# Patient Record
Sex: MALE | Race: ASIAN
[De-identification: names, ages, dates, MRNs, and addresses within clinical notes are randomized per-mention and may not be internally consistent; named-entity substitution may affect disease eponyms.]

---

## 2018-02-18 ENCOUNTER — HOSPITAL ENCOUNTER (EMERGENCY)
Dept: HOSPITAL 45 - C.EDB | Age: 23
Discharge: HOME | End: 2018-02-18
Payer: COMMERCIAL

## 2018-02-18 VITALS
BODY MASS INDEX: 23.82 KG/M2 | BODY MASS INDEX: 23.82 KG/M2 | HEIGHT: 67.99 IN | WEIGHT: 157.19 LBS | HEIGHT: 67.99 IN | WEIGHT: 157.19 LBS

## 2018-02-18 VITALS — HEART RATE: 82 BPM | SYSTOLIC BLOOD PRESSURE: 138 MMHG | DIASTOLIC BLOOD PRESSURE: 86 MMHG | OXYGEN SATURATION: 100 %

## 2018-02-18 VITALS — TEMPERATURE: 98.42 F

## 2018-02-18 DIAGNOSIS — R04.0: ICD-10-CM

## 2018-02-18 DIAGNOSIS — Z91.048: ICD-10-CM

## 2018-02-18 DIAGNOSIS — R07.89: Primary | ICD-10-CM

## 2018-02-18 LAB
BASOPHILS # BLD: 0.03 K/UL (ref 0–0.2)
BASOPHILS NFR BLD: 0.4 %
BUN SERPL-MCNC: 14 MG/DL (ref 7–18)
CA-I BLD-SCNC: 1.22 MMOL/L (ref 1.12–1.32)
CALCIUM SERPL-MCNC: 9.4 MG/DL (ref 8.5–10.1)
CO2 SERPL-SCNC: 27 MMOL/L (ref 21–32)
CREAT BLD-MCNC: 1.1 MG/DL (ref 0.6–1.3)
CREAT SERPL-MCNC: 1.07 MG/DL (ref 0.6–1.4)
EOS ABS #: 0.38 K/UL (ref 0–0.5)
EOSINOPHIL NFR BLD AUTO: 304 K/UL (ref 130–400)
GLUCOSE SERPL-MCNC: 80 MG/DL (ref 70–99)
HCT VFR BLD CALC: 46.4 % (ref 42–52)
HGB BLD-MCNC: 16.4 G/DL (ref 14–18)
IG#: 0.02 K/UL (ref 0–0.02)
IMM GRANULOCYTES NFR BLD AUTO: 28.1 %
ISTAT POTASSIUM: 4 MEQ/L (ref 3.3–5)
LYMPHOCYTES # BLD: 2.4 K/UL (ref 1.2–3.4)
MCH RBC QN AUTO: 31.3 PG (ref 25–34)
MCHC RBC AUTO-ENTMCNC: 35.3 G/DL (ref 32–36)
MCV RBC AUTO: 88.5 FL (ref 80–100)
MONO ABS #: 0.83 K/UL (ref 0.11–0.59)
MONOCYTES NFR BLD: 9.7 %
NEUT ABS #: 4.87 K/UL (ref 1.4–6.5)
NEUTROPHILS # BLD AUTO: 4.5 %
NEUTROPHILS NFR BLD AUTO: 57.1 %
PMV BLD AUTO: 9.8 FL (ref 7.4–10.4)
POTASSIUM SERPL-SCNC: 3.7 MMOL/L (ref 3.5–5.1)
RED CELL DISTRIBUTION WIDTH CV: 12.6 % (ref 11.5–14.5)
RED CELL DISTRIBUTION WIDTH SD: 40.2 FL (ref 36.4–46.3)
SODIUM BLD-SCNC: 145 MEQ/L (ref 135–144)
SODIUM SERPL-SCNC: 142 MMOL/L (ref 136–145)
WBC # BLD AUTO: 8.53 K/UL (ref 4.8–10.8)

## 2018-02-18 NOTE — EMERGENCY ROOM VISIT NOTE
History


Report prepared by Shantiibe:  Rhea Rodriguez


Under the Supervision of:  Dr. Gurmeet Chau M.D.


First contact with patient:  10:37


Chief Complaint:  CHEST PAIN


Stated Complaint:  CHEST PAIN/SHOULDER&NECK PAIN & NOSE BLEED


Nursing Triage Summary:  


Last Monday CP on the left side, pain with deep inspiration, stretching chest, 


and when lying down. Denies shortness of breath. Then developed right chest 

pain 


hurts in "jolts" and when it comes it radiates into right side of neck and 


shoulder. Pt also c/o nose bleeds for approximately a month. Pt also verbalizes 


he was at [facility] yesterday, was up for 37 hours and felt like he was going to 

pass 


out so went home. 











Pt traveled to [place] and [place] Dec 27th for 10 days. 











Pt went to [facility] for this two days ago, did EKG and DDMR which were both 

negative. 














Presents today because pain is not going away and nose bleeding seems worse 

this 


weekend.





History of Present Illness


The patient is a 22 year old male who presents to the Emergency Room with 

complaints of intermittent chest pain for the past 1 week. He states the pain 

occasionally radiates into the right side of his neck and his right shoulder. 

He rates his pain as a 3/10 in severity. Laying down worsens his pain. He 

denies any shortness of breath or rashes. He went to [facility] recently for his chest 

pain and produced a negative EKG and D-Dimer. The patient also complains of 

epistaxis for the past 1 month. He was at [facility] yesterday and was awake for 37 

hours and felt like he was going to pass out, so he left. He states he did not 

actually pass out. He denies any recent fevers, chills, cough or cold symptoms. 

He states he has no chronic medical problems and only takes Claritin as needed. 

He denies any recent drug use. The patient notes he did recently travel to 

[place] and [place] in late December.





   Source of History:  patient


   Onset:  1 month PTA


   Position:  chest


   Symptom Intensity:  3/10


   Timing:  intermittent


   Modifying Factors (Worsening):  rest (laying down)


   Associated Symptoms:  No LOC, No fevers, No chills, No cough (or cold 

symptoms), No SOB





Review of Systems


See HPI for pertinent positives & negatives. A total of 10 systems reviewed and 

were otherwise negative.





Past Medical & Surgical


Medical Problems:


(1) Seasonal allergies








Social History


Smoking Status:  Never Smoker


Alcohol Use:  occasionally


Drug Use:  none


Marital Status:  single


Housing Status:  lives with roommate


Occupation Status:  [employer] student





Current/Historical Medications


No Active Prescriptions or Reported Meds





Allergies


Coded Allergies:  


     No Known Allergies (Unverified , 2/18/18)





Physical Exam


Vital Signs











  Date Time  Temp Pulse Resp B/P (MAP) Pulse Ox O2 Delivery O2 Flow Rate FiO2


 


2/18/18 12:08  82 16 138/86 100   


 


2/18/18 10:28 36.9 92 18 135/88 98 Room Air  


 


2/18/18 10:28     97 Room Air  











Physical Exam


GENERAL: Patient is mildly anxious appearing and in minimal distress.


HEENT: No acute trauma, normocephalic atraumatic, mucous membranes moist, no 

nasal congestion, no scleral icterus.


NECK: No stridor, no adenopathy, no meningismus, trachea is midline.


LUNGS: No dyspnea. Clear to auscultation and equal bilaterally. No wheeze, no 

rhonchi.


HEART: Regular rate and rhythm.  No murmurs, rubs, gallops appreciated.


ABDOMEN: Soft, nontender, bowel sounds positive, no masses appreciated, no 

peritonitis.


BACK: No midline tenderness, no CVA tenderness


EXTREMITIES: Normal motion all extremities, no cyanosis, no edema.


NEUROLOGIC: Alert and oriented, no acute motor or sensory deficits, no focal 

weakness, cranial nerves grossly intact.


SKIN: No rash, no jaundice, no diaphoresis.





Medical Decision & Procedures


ER Provider


Diagnostic Interpretation:


Radiology results and stated below per my review and radiologist interpretation:





CT ANGIOGRAPHY OF THE CHEST, PULMONARY EMBOLUS PROTOCOL





CLINICAL HISTORY: Pleuritic chest pain. Recent travel.    





COMPARISON STUDY:  No previous studies for comparison. 





TECHNIQUE: Following IV administration of 91 mL of Optiray-320, helical axial


images of the chest were obtained utilizing the pulmonary embolus protocol. 


Maximal intensity projections and sagittal and coronal reformats were viewed on


an independent 3D workstation.  IV contrast was administered without


complication.  A dose lowering technique was utilized adhering to the principles


of ALARA.





CT DOSE: 253.55 mGy.cm





FINDINGS:  No pulmonary emboli are identified. There is no evidence for thoracic


aortic dissection. Size of the heart is at the upper limits of normal. There is


no pericardial effusion. No enlarged thoracic lymph nodes are present. Central


airways are patent. No pneumothorax or pleural effusion is present. There is no


consolidation to suggest pneumonia. Bony thorax and upper abdomen are


unremarkable.





IMPRESSION:  





1. No pulmonary emboli identified.


2. No acute intrathoracic findings.





Electronically signed by:  Edouard Negron M.D.


2/18/2018 11:51 AM





Laboratory Results


2/18/18 10:50








Red Blood Count 5.24, Mean Corpuscular Volume 88.5, Mean Corpuscular Hemoglobin 

31.3, Mean Corpuscular Hemoglobin Concent 35.3, Mean Platelet Volume 9.8, 

Neutrophils (%) (Auto) 57.1, Lymphocytes (%) (Auto) 28.1, Monocytes (%) (Auto) 

9.7, Eosinophils (%) (Auto) 4.5, Basophils (%) (Auto) 0.4, Neutrophils # (Auto) 

4.87, Lymphocytes # (Auto) 2.40, Monocytes # (Auto) 0.83, Eosinophils # (Auto) 

0.38, Basophils # (Auto) 0.03





2/18/18 10:50

















Test


  2/18/18


10:50 2/18/18


10:59


 


White Blood Count


  8.53 K/uL


(4.8-10.8) 


 


 


Red Blood Count


  5.24 M/uL


(4.7-6.1) 


 


 


Hemoglobin


  16.4 g/dL


(14.0-18.0) 


 


 


Hematocrit 46.4 % (42-52)  


 


Mean Corpuscular Volume


  88.5 fL


() 


 


 


Mean Corpuscular Hemoglobin


  31.3 pg


(25-34) 


 


 


Mean Corpuscular Hemoglobin


Concent 35.3 g/dl


(32-36) 


 


 


Platelet Count


  304 K/uL


(130-400) 


 


 


Mean Platelet Volume


  9.8 fL


(7.4-10.4) 


 


 


Neutrophils (%) (Auto) 57.1 %  


 


Lymphocytes (%) (Auto) 28.1 %  


 


Monocytes (%) (Auto) 9.7 %  


 


Eosinophils (%) (Auto) 4.5 %  


 


Basophils (%) (Auto) 0.4 %  


 


Neutrophils # (Auto)


  4.87 K/uL


(1.4-6.5) 


 


 


Lymphocytes # (Auto)


  2.40 K/uL


(1.2-3.4) 


 


 


Monocytes # (Auto)


  0.83 K/uL


(0.11-0.59) 


 


 


Eosinophils # (Auto)


  0.38 K/uL


(0-0.5) 


 


 


Basophils # (Auto)


  0.03 K/uL


(0-0.2) 


 


 


RDW Standard Deviation


  40.2 fL


(36.4-46.3) 


 


 


RDW Coefficient of Variation


  12.6 %


(11.5-14.5) 


 


 


Immature Granulocyte % (Auto) 0.2 %  


 


Immature Granulocyte # (Auto)


  0.02 K/uL


(0.00-0.02) 


 


 


Est Creatinine Clear Calc


Drug Dose 104.7 ml/min 


  


 


 


Estimated GFR (


American) 113.6 


  


 


 


Estimated GFR (Non-


American 98.0 


  


 


 


BUN/Creatinine Ratio 13.0 (10-20)  


 


Calcium Level


  9.4 mg/dl


(8.5-10.1) 


 


 


Total Creatine Kinase


  340 U/L


() 


 


 


Troponin I


  < 0.015 ng/ml


(0-0.045) 


 


 


Bedside Hemoglobin


  


  17.0 g/dl


(14.0-18.0)


 


Bedside Hematocrit  50 % (42-52) 


 


Bedside Sodium


  


  145 mEq/L


(135-144)


 


Bedside Potassium


  


  4.0 mEq/L


(3.3-5.0)


 


Bedside Chloride


  


  104 mEq/L


(101-112)


 


Bedside Total CO2


  


  32 mEq/l


(24-31)


 


Anion Gap


  


  14.0 mmol/L


(16-25)


 


Bedside Blood Urea Nitrogen


  


  16 mg/dl


(7-18)


 


Bedside Creatinine


  


  1.1 mg/dl


(0.6-1.3)


 


Bedside Glucose (other)


  


  82 mg/dl


(70-99)


 


Bedside Ionized Calcium (Justice)


  


  1.22 mmol/l


(1.12-1.32)





Laboratory results as reviewed by me.





Medications Administered











 Medications


  (Trade)  Dose


 Ordered  Sig/David


 Route  Start Time


 Stop Time Status Last Admin


Dose Admin


 


 Sodium Chloride  1,000 ml @ 


 999 mls/hr  Q1H1M STAT


 IV  2/18/18 10:49


 2/18/18 11:49 DC 2/18/18 10:54


999 MLS/HR











ECG Per My Interpretation


Indication:  chest pain


Rate (beats per minute):  78


Rhythm:  normal sinus


Findings:  no acute ischemic change, no ectopy, other (Right axis deviation)





ED Course


1039: The patient was evaluated in room C11. A complete history and physical 

exam was performed.





1049: NSS 1000 ml @ 999 mls/hr IV. 





1200: I reevaluated the patient. He is feeling well and resting comfortably. I 

discussed his results and discharge instructions and he verbalized complete 

understanding and agreement.





Medical Decision


Differential: Cardiac Ischemia (STEMI, NSTEMI, Unstable Angina, etc), Aortic 

Dissection, Arrhythmia, Pulmonary Embolism, Pneumonia, Pneumothorax, MSK, 

Infectious, Pericarditis/Myocarditis, Esophageal Rupture, Gastrointestinal, 

amongst other pathologies entertained.





22 yr old male with bilateral chest pain/discomfort over last few weeks 

gradually worsening.  Recent travel and symptoms are pleuritic, thus despite 

normal Dimer yesterday felt he was no longer considered low risk for PE thus CT 

PE done.   CT negative.  EKG normal.  Labs normal.  Patient looks well.  No 

evidence PE, dissection, asc, infection, pericarditis, nor other acute issue.  

Likely MSK/costochondritis.  Follow up with S.  Stable at discharge in no 

distress.





Medication Reconcilliation


Current Medication List:  was personally reviewed by me





Blood Pressure Screening


Patient's blood pressure:  Normal blood pressure


Blood pressure disposition:  Did not require urgent referral





Impression





 Primary Impression:  


 Chest wall pain





Scribe Attestation


The scribe's documentation has been prepared under my direction and personally 

reviewed by me in its entirety. I confirm that the note above accurately 

reflects all work, treatment, procedures, and medical decision making performed 

by me.





Departure Information


Dispostion


Home / Self-Care





Prescriptions





No Active Prescriptions or Reported Meds





Referrals


[facility]





Patient Instructions


ED Chest Pain Costochondritis, My [facility]

## 2018-02-18 NOTE — DIAGNOSTIC IMAGING REPORT
CT ANGIOGRAPHY OF THE CHEST, PULMONARY EMBOLUS PROTOCOL



CLINICAL HISTORY: Pleuritic chest pain. Recent travel.    



COMPARISON STUDY:  No previous studies for comparison. 



TECHNIQUE: Following IV administration of 91 mL of Optiray-320, helical axial

images of the chest were obtained utilizing the pulmonary embolus protocol. 

Maximal intensity projections and sagittal and coronal reformats were viewed on

an independent 3D workstation.  IV contrast was administered without

complication.  A dose lowering technique was utilized adhering to the principles

of ALARA.





CT DOSE: 253.55 mGy.cm



FINDINGS:  No pulmonary emboli are identified. There is no evidence for thoracic

aortic dissection. Size of the heart is at the upper limits of normal. There is

no pericardial effusion. No enlarged thoracic lymph nodes are present. Central

airways are patent. No pneumothorax or pleural effusion is present. There is no

consolidation to suggest pneumonia. Bony thorax and upper abdomen are

unremarkable.



IMPRESSION:  



1. No pulmonary emboli identified.



2. No acute intrathoracic findings.











Electronically signed by:  Edouard Negron M.D.

2/18/2018 11:51 AM



Dictated Date/Time:  2/18/2018 11:44 AM

## 2022-09-27 ENCOUNTER — APPOINTMENT (RX ONLY)
Dept: URBAN - METROPOLITAN AREA CLINIC 164 | Facility: CLINIC | Age: 27
Setting detail: DERMATOLOGY
End: 2022-09-27

## 2022-09-27 DIAGNOSIS — L91.8 OTHER HYPERTROPHIC DISORDERS OF THE SKIN: ICD-10-CM

## 2022-09-27 DIAGNOSIS — L942 OTHER SPECIFIED DISORDERS OF SKIN: ICD-10-CM

## 2022-09-27 DIAGNOSIS — L988 OTHER SPECIFIED DISORDERS OF SKIN: ICD-10-CM

## 2022-09-27 PROBLEM — D23.9 OTHER BENIGN NEOPLASM OF SKIN, UNSPECIFIED: Status: ACTIVE | Noted: 2022-09-27

## 2022-09-27 PROCEDURE — ? COUNSELING

## 2022-09-27 PROCEDURE — 99202 OFFICE O/P NEW SF 15 MIN: CPT | Mod: 25

## 2022-09-27 PROCEDURE — ? LIQUID NITROGEN

## 2022-09-27 PROCEDURE — 17110 DESTRUCTION B9 LES UP TO 14: CPT

## 2022-09-27 ASSESSMENT — LOCATION DETAILED DESCRIPTION DERM: LOCATION DETAILED: VENTRAL GLANS PENIS

## 2022-09-27 ASSESSMENT — LOCATION ZONE DERM: LOCATION ZONE: PENIS

## 2022-09-27 ASSESSMENT — LOCATION SIMPLE DESCRIPTION DERM: LOCATION SIMPLE: PENIS

## 2022-09-27 NOTE — PROCEDURE: LIQUID NITROGEN
Spray Paint Technique: No
Consent: The patient's consent was obtained including but not limited to risks of crusting, scabbing, blistering, scarring, darker or lighter pigmentary change, recurrence, incomplete removal and infection.
Duration Of Freeze Thaw-Cycle (Seconds): 3
Medical Necessity Information: It is in your best interest to select a reason for this procedure from the list below. All of these items fulfill various CMS LCD requirements except the new and changing color options.
Post-Care Instructions: I reviewed with the patient in detail post-care instructions. Patient is to wear sunprotection, and avoid picking at any of the treated lesions. Pt may apply Vaseline to crusted or scabbing areas.
Number Of Freeze-Thaw Cycles: 1 freeze-thaw cycle
Spray Paint Text: The liquid nitrogen was applied to the skin utilizing a spray paint frosting technique.
Show Aperture Variable?: Yes
Detail Level: Detailed
Medical Necessity Clause: This procedure was medically necessary because the lesions that were treated were: